# Patient Record
Sex: FEMALE | Race: OTHER | HISPANIC OR LATINO | URBAN - METROPOLITAN AREA
[De-identification: names, ages, dates, MRNs, and addresses within clinical notes are randomized per-mention and may not be internally consistent; named-entity substitution may affect disease eponyms.]

---

## 2018-06-25 ENCOUNTER — EMERGENCY (EMERGENCY)
Facility: HOSPITAL | Age: 43
LOS: 1 days | Discharge: HOME | End: 2018-06-25
Attending: EMERGENCY MEDICINE | Admitting: EMERGENCY MEDICINE
Payer: COMMERCIAL

## 2018-06-25 VITALS
SYSTOLIC BLOOD PRESSURE: 134 MMHG | HEART RATE: 55 BPM | TEMPERATURE: 98 F | RESPIRATION RATE: 18 BRPM | DIASTOLIC BLOOD PRESSURE: 80 MMHG | OXYGEN SATURATION: 98 %

## 2018-06-25 VITALS
HEART RATE: 75 BPM | DIASTOLIC BLOOD PRESSURE: 75 MMHG | WEIGHT: 132.94 LBS | HEIGHT: 61 IN | RESPIRATION RATE: 189 BRPM | TEMPERATURE: 98 F | OXYGEN SATURATION: 96 % | SYSTOLIC BLOOD PRESSURE: 165 MMHG

## 2018-06-25 LAB
ALBUMIN SERPL ELPH-MCNC: 4.5 G/DL — SIGNIFICANT CHANGE UP (ref 3.3–5)
ALP SERPL-CCNC: 53 U/L — SIGNIFICANT CHANGE UP (ref 40–120)
ALT FLD-CCNC: 5 U/L — LOW (ref 10–45)
ANION GAP SERPL CALC-SCNC: 12 MMOL/L — SIGNIFICANT CHANGE UP (ref 5–17)
APPEARANCE UR: CLEAR — SIGNIFICANT CHANGE UP
AST SERPL-CCNC: 14 U/L — SIGNIFICANT CHANGE UP (ref 10–40)
BASOPHILS NFR BLD AUTO: 1 % — SIGNIFICANT CHANGE UP (ref 0–2)
BILIRUB SERPL-MCNC: 0.5 MG/DL — SIGNIFICANT CHANGE UP (ref 0.2–1.2)
BILIRUB UR-MCNC: NEGATIVE — SIGNIFICANT CHANGE UP
BUN SERPL-MCNC: 12 MG/DL — SIGNIFICANT CHANGE UP (ref 7–23)
CALCIUM SERPL-MCNC: 8.8 MG/DL — SIGNIFICANT CHANGE UP (ref 8.4–10.5)
CHLORIDE SERPL-SCNC: 102 MMOL/L — SIGNIFICANT CHANGE UP (ref 96–108)
CO2 SERPL-SCNC: 24 MMOL/L — SIGNIFICANT CHANGE UP (ref 22–31)
COLOR SPEC: YELLOW — SIGNIFICANT CHANGE UP
CREAT SERPL-MCNC: 0.67 MG/DL — SIGNIFICANT CHANGE UP (ref 0.5–1.3)
DIFF PNL FLD: ABNORMAL
EOSINOPHIL NFR BLD AUTO: 1.7 % — SIGNIFICANT CHANGE UP (ref 0–6)
EXTRA BLUE TOP TUBE: SIGNIFICANT CHANGE UP
GLUCOSE SERPL-MCNC: 95 MG/DL — SIGNIFICANT CHANGE UP (ref 70–99)
GLUCOSE UR QL: NEGATIVE — SIGNIFICANT CHANGE UP
HCT VFR BLD CALC: 26.7 % — LOW (ref 34.5–45)
HGB BLD-MCNC: 7.6 G/DL — LOW (ref 11.5–15.5)
KETONES UR-MCNC: NEGATIVE — SIGNIFICANT CHANGE UP
LEUKOCYTE ESTERASE UR-ACNC: ABNORMAL
LYMPHOCYTES # BLD AUTO: 32.3 % — SIGNIFICANT CHANGE UP (ref 13–44)
MCHC RBC-ENTMCNC: 19.7 PG — LOW (ref 27–34)
MCHC RBC-ENTMCNC: 28.5 G/DL — LOW (ref 32–36)
MCV RBC AUTO: 69.2 FL — LOW (ref 80–100)
MONOCYTES NFR BLD AUTO: 7.7 % — SIGNIFICANT CHANGE UP (ref 2–14)
NEUTROPHILS NFR BLD AUTO: 57.3 % — SIGNIFICANT CHANGE UP (ref 43–77)
NITRITE UR-MCNC: NEGATIVE — SIGNIFICANT CHANGE UP
PH UR: 6 — SIGNIFICANT CHANGE UP (ref 5–8)
PLATELET # BLD AUTO: 388 K/UL — SIGNIFICANT CHANGE UP (ref 150–400)
POTASSIUM SERPL-MCNC: 4.4 MMOL/L — SIGNIFICANT CHANGE UP (ref 3.5–5.3)
POTASSIUM SERPL-SCNC: 4.4 MMOL/L — SIGNIFICANT CHANGE UP (ref 3.5–5.3)
PROT SERPL-MCNC: 7.7 G/DL — SIGNIFICANT CHANGE UP (ref 6–8.3)
PROT UR-MCNC: NEGATIVE MG/DL — SIGNIFICANT CHANGE UP
RBC # BLD: 3.86 M/UL — SIGNIFICANT CHANGE UP (ref 3.8–5.2)
RBC # FLD: 17.3 % — HIGH (ref 10.3–16.9)
SODIUM SERPL-SCNC: 138 MMOL/L — SIGNIFICANT CHANGE UP (ref 135–145)
SP GR SPEC: <=1.005 — SIGNIFICANT CHANGE UP (ref 1–1.03)
UROBILINOGEN FLD QL: 0.2 E.U./DL — SIGNIFICANT CHANGE UP
WBC # BLD: 5.9 K/UL — SIGNIFICANT CHANGE UP (ref 3.8–10.5)
WBC # FLD AUTO: 5.9 K/UL — SIGNIFICANT CHANGE UP (ref 3.8–10.5)

## 2018-06-25 PROCEDURE — 81001 URINALYSIS AUTO W/SCOPE: CPT

## 2018-06-25 PROCEDURE — 99285 EMERGENCY DEPT VISIT HI MDM: CPT

## 2018-06-25 PROCEDURE — 36415 COLL VENOUS BLD VENIPUNCTURE: CPT

## 2018-06-25 PROCEDURE — 74176 CT ABD & PELVIS W/O CONTRAST: CPT | Mod: 26

## 2018-06-25 PROCEDURE — 96375 TX/PRO/DX INJ NEW DRUG ADDON: CPT

## 2018-06-25 PROCEDURE — 99284 EMERGENCY DEPT VISIT MOD MDM: CPT | Mod: 25

## 2018-06-25 PROCEDURE — 85025 COMPLETE CBC W/AUTO DIFF WBC: CPT

## 2018-06-25 PROCEDURE — 80053 COMPREHEN METABOLIC PANEL: CPT

## 2018-06-25 PROCEDURE — 96374 THER/PROPH/DIAG INJ IV PUSH: CPT

## 2018-06-25 PROCEDURE — 74176 CT ABD & PELVIS W/O CONTRAST: CPT

## 2018-06-25 RX ORDER — MOXIFLOXACIN HYDROCHLORIDE TABLETS, 400 MG 400 MG/1
1 TABLET, FILM COATED ORAL
Qty: 6 | Refills: 0 | OUTPATIENT
Start: 2018-06-25 | End: 2018-06-27

## 2018-06-25 RX ORDER — KETOROLAC TROMETHAMINE 30 MG/ML
30 SYRINGE (ML) INJECTION ONCE
Qty: 0 | Refills: 0 | Status: DISCONTINUED | OUTPATIENT
Start: 2018-06-25 | End: 2018-06-25

## 2018-06-25 RX ORDER — ACETAMINOPHEN WITH CODEINE 300MG-30MG
2 TABLET ORAL
Qty: 20 | Refills: 0 | OUTPATIENT
Start: 2018-06-25 | End: 2018-06-29

## 2018-06-25 RX ORDER — IRON SUCROSE 20 MG/ML
200 INJECTION, SOLUTION INTRAVENOUS ONCE
Qty: 0 | Refills: 0 | Status: COMPLETED | OUTPATIENT
Start: 2018-06-25 | End: 2018-06-25

## 2018-06-25 RX ORDER — CIPROFLOXACIN LACTATE 400MG/40ML
500 VIAL (ML) INTRAVENOUS ONCE
Qty: 0 | Refills: 0 | Status: COMPLETED | OUTPATIENT
Start: 2018-06-25 | End: 2018-06-25

## 2018-06-25 RX ORDER — ONDANSETRON 8 MG/1
4 TABLET, FILM COATED ORAL ONCE
Qty: 0 | Refills: 0 | Status: COMPLETED | OUTPATIENT
Start: 2018-06-25 | End: 2018-06-25

## 2018-06-25 RX ORDER — ACETAMINOPHEN WITH CODEINE 300MG-30MG
2 TABLET ORAL ONCE
Qty: 0 | Refills: 0 | Status: DISCONTINUED | OUTPATIENT
Start: 2018-06-25 | End: 2018-06-25

## 2018-06-25 RX ORDER — SODIUM CHLORIDE 9 MG/ML
1000 INJECTION INTRAMUSCULAR; INTRAVENOUS; SUBCUTANEOUS ONCE
Qty: 0 | Refills: 0 | Status: COMPLETED | OUTPATIENT
Start: 2018-06-25 | End: 2018-06-25

## 2018-06-25 RX ORDER — MORPHINE SULFATE 50 MG/1
4 CAPSULE, EXTENDED RELEASE ORAL ONCE
Qty: 0 | Refills: 0 | Status: DISCONTINUED | OUTPATIENT
Start: 2018-06-25 | End: 2018-06-25

## 2018-06-25 RX ADMIN — MORPHINE SULFATE 4 MILLIGRAM(S): 50 CAPSULE, EXTENDED RELEASE ORAL at 20:45

## 2018-06-25 RX ADMIN — MORPHINE SULFATE 4 MILLIGRAM(S): 50 CAPSULE, EXTENDED RELEASE ORAL at 19:52

## 2018-06-25 RX ADMIN — Medication 2 TABLET(S): at 22:25

## 2018-06-25 RX ADMIN — Medication 30 MILLIGRAM(S): at 18:08

## 2018-06-25 RX ADMIN — IRON SUCROSE 110 MILLIGRAM(S): 20 INJECTION, SOLUTION INTRAVENOUS at 21:18

## 2018-06-25 RX ADMIN — ONDANSETRON 4 MILLIGRAM(S): 8 TABLET, FILM COATED ORAL at 18:08

## 2018-06-25 RX ADMIN — SODIUM CHLORIDE 2000 MILLILITER(S): 9 INJECTION INTRAMUSCULAR; INTRAVENOUS; SUBCUTANEOUS at 18:07

## 2018-06-25 RX ADMIN — Medication 30 MILLIGRAM(S): at 18:22

## 2018-06-25 RX ADMIN — Medication 500 MILLIGRAM(S): at 22:25

## 2018-06-25 NOTE — ED PROVIDER NOTE - MEDICAL DECISION MAKING DETAILS
right flank pain and nausea concerning for kidney stone. pt well appearing. pain meds and fluids given. dispo pending labs and ct scan

## 2018-06-25 NOTE — ED PROVIDER NOTE - PROGRESS NOTE DETAILS
Fe def anemia noted- px with mild sob, occ lightheadedness- hx of anemia- compliant w iron- required infusions during preg- hgb dropped to 4- will give dose of iron ref to heme- also to OB_ px w Copper IUD- bleeding increased for 4 d to 10 d- will refer to OB- for IUD removal sx w urination- will give 3 d course of cipro

## 2018-06-25 NOTE — ED PROVIDER NOTE - ATTENDING CONTRIBUTION TO CARE
42 F w Flank pain- R sided rad to abd- mild x 2 days, pain spiked today  pain in flank when urinates- no burning /dysuria  vss  s1s2 lungs cta bl  abd soft nt nd +bs + R cva ttp  IMP- Flank pain  RO stones, labs, UA

## 2018-06-25 NOTE — ED ADULT NURSE NOTE - OBJECTIVE STATEMENT
Patient presents to the ED complaining of right sided flank pain, getting worst today. Denies any fever or chills.

## 2018-06-25 NOTE — ED PROVIDER NOTE - OBJECTIVE STATEMENT
43 y/o female with hx of HTN c/o right flank pain x 2 days. pt states mild pain past 2 days and worsened today. + sharp pain radiates to abdomen. no fever or chills. + nausea. no dysuria or hematuria. pt reports pain in right flank with urination. no vomiting. no cp or sob. no further complaints.

## 2018-06-25 NOTE — ED PROVIDER NOTE - CARE PLAN
Principal Discharge DX:	Flank pain Principal Discharge DX:	Flank pain  Secondary Diagnosis:	Iron deficiency anemia, unspecified iron deficiency anemia type Principal Discharge DX:	Flank pain  Secondary Diagnosis:	Iron deficiency anemia, unspecified iron deficiency anemia type  Secondary Diagnosis:	Cystitis

## 2018-06-25 NOTE — ED PROVIDER NOTE - GASTROINTESTINAL, MLM
Abdomen soft,   + tenderness to right cva region. abd non tender. no guarding. no rebound. no distention

## 2018-06-29 DIAGNOSIS — D50.9 IRON DEFICIENCY ANEMIA, UNSPECIFIED: ICD-10-CM

## 2018-06-29 DIAGNOSIS — R10.9 UNSPECIFIED ABDOMINAL PAIN: ICD-10-CM

## 2018-06-29 DIAGNOSIS — N30.90 CYSTITIS, UNSPECIFIED WITHOUT HEMATURIA: ICD-10-CM

## 2018-06-29 DIAGNOSIS — I10 ESSENTIAL (PRIMARY) HYPERTENSION: ICD-10-CM

## 2018-07-03 ENCOUNTER — LABORATORY RESULT (OUTPATIENT)
Age: 43
End: 2018-07-03

## 2018-07-03 ENCOUNTER — APPOINTMENT (OUTPATIENT)
Dept: HEMATOLOGY ONCOLOGY | Facility: CLINIC | Age: 43
End: 2018-07-03
Payer: COMMERCIAL

## 2018-07-03 VITALS
SYSTOLIC BLOOD PRESSURE: 160 MMHG | WEIGHT: 133 LBS | BODY MASS INDEX: 25.11 KG/M2 | HEART RATE: 70 BPM | TEMPERATURE: 98.8 F | HEIGHT: 61 IN | DIASTOLIC BLOOD PRESSURE: 92 MMHG | RESPIRATION RATE: 14 BRPM | OXYGEN SATURATION: 100 %

## 2018-07-03 DIAGNOSIS — Z78.9 OTHER SPECIFIED HEALTH STATUS: ICD-10-CM

## 2018-07-03 DIAGNOSIS — Z86.79 PERSONAL HISTORY OF OTHER DISEASES OF THE CIRCULATORY SYSTEM: ICD-10-CM

## 2018-07-03 DIAGNOSIS — Z86.39 PERSONAL HISTORY OF OTHER ENDOCRINE, NUTRITIONAL AND METABOLIC DISEASE: ICD-10-CM

## 2018-07-03 DIAGNOSIS — Z80.3 FAMILY HISTORY OF MALIGNANT NEOPLASM OF BREAST: ICD-10-CM

## 2018-07-03 DIAGNOSIS — Z84.1 FAMILY HISTORY OF DISORDERS OF KIDNEY AND URETER: ICD-10-CM

## 2018-07-03 DIAGNOSIS — Z83.79 FAMILY HISTORY OF OTHER DISEASES OF THE DIGESTIVE SYSTEM: ICD-10-CM

## 2018-07-03 DIAGNOSIS — Z82.49 FAMILY HISTORY OF ISCHEMIC HEART DISEASE AND OTHER DISEASES OF THE CIRCULATORY SYSTEM: ICD-10-CM

## 2018-07-03 DIAGNOSIS — Z83.3 FAMILY HISTORY OF DIABETES MELLITUS: ICD-10-CM

## 2018-07-03 PROCEDURE — 99204 OFFICE O/P NEW MOD 45 MIN: CPT | Mod: 25

## 2018-07-03 PROCEDURE — 36415 COLL VENOUS BLD VENIPUNCTURE: CPT

## 2018-07-03 RX ORDER — ASPIRIN 81 MG
81 TABLET, DELAYED RELEASE (ENTERIC COATED) ORAL DAILY
Refills: 0 | Status: ACTIVE | COMMUNITY

## 2018-07-05 ENCOUNTER — RESULT REVIEW (OUTPATIENT)
Age: 43
End: 2018-07-05

## 2018-07-05 LAB
25(OH)D3 SERPL-MCNC: 8.4 NG/ML
BASOPHILS # BLD AUTO: 0.05 K/UL
BASOPHILS NFR BLD AUTO: 0.9 %
EOSINOPHIL # BLD AUTO: 0.13 K/UL
EOSINOPHIL NFR BLD AUTO: 2.2 %
ERYTHROCYTE [SEDIMENTATION RATE] IN BLOOD BY WESTERGREN METHOD: 33 MM/HR
FERRITIN SERPL-MCNC: 47 NG/ML
HAPTOGLOB SERPL-MCNC: 136 MG/DL
HCT VFR BLD CALC: 30.8 %
HGB BLD-MCNC: 8.8 G/DL
IMM GRANULOCYTES NFR BLD AUTO: 0.2 %
IRON SATN MFR SERPL: 6 %
IRON SERPL-MCNC: 24 UG/DL
LDH SERPL-CCNC: 230 U/L
LYMPHOCYTES # BLD AUTO: 1.56 K/UL
LYMPHOCYTES NFR BLD AUTO: 26.7 %
MAN DIFF?: NORMAL
MCHC RBC-ENTMCNC: 20.4 PG
MCHC RBC-ENTMCNC: 28.6 GM/DL
MCV RBC AUTO: 71.5 FL
MONOCYTES # BLD AUTO: 0.29 K/UL
MONOCYTES NFR BLD AUTO: 5 %
NEUTROPHILS # BLD AUTO: 3.8 K/UL
NEUTROPHILS NFR BLD AUTO: 65 %
PLATELET # BLD AUTO: 240 K/UL
RBC # BLD: 4.31 M/UL
RBC # FLD: 20.5 %
TIBC SERPL-MCNC: 402 UG/DL
TSH SERPL-ACNC: 6.04 UIU/ML
UIBC SERPL-MCNC: 378 UG/DL
VIT B12 SERPL-MCNC: 254 PG/ML
WBC # FLD AUTO: 5.84 K/UL

## 2018-07-06 ENCOUNTER — APPOINTMENT (OUTPATIENT)
Dept: OBGYN | Facility: CLINIC | Age: 43
End: 2018-07-06

## 2018-07-11 RX ORDER — FERUMOXYTOL 510 MG/17ML
510 INJECTION INTRAVENOUS ONCE
Qty: 0 | Refills: 0 | Status: COMPLETED | OUTPATIENT
Start: 2018-07-12 | End: 2018-07-12

## 2018-07-12 ENCOUNTER — OUTPATIENT (OUTPATIENT)
Dept: OUTPATIENT SERVICES | Facility: HOSPITAL | Age: 43
LOS: 1 days | End: 2018-07-12
Payer: COMMERCIAL

## 2018-07-12 ENCOUNTER — APPOINTMENT (OUTPATIENT)
Dept: INFUSION THERAPY | Facility: HOSPITAL | Age: 43
End: 2018-07-12

## 2018-07-12 VITALS
TEMPERATURE: 98 F | RESPIRATION RATE: 18 BRPM | HEART RATE: 70 BPM | DIASTOLIC BLOOD PRESSURE: 70 MMHG | SYSTOLIC BLOOD PRESSURE: 100 MMHG | OXYGEN SATURATION: 99 %

## 2018-07-12 DIAGNOSIS — D50.9 IRON DEFICIENCY ANEMIA, UNSPECIFIED: ICD-10-CM

## 2018-07-12 PROCEDURE — 96365 THER/PROPH/DIAG IV INF INIT: CPT

## 2018-07-12 RX ADMIN — FERUMOXYTOL 117 MILLIGRAM(S): 510 INJECTION INTRAVENOUS at 14:38

## 2018-07-16 ENCOUNTER — APPOINTMENT (OUTPATIENT)
Dept: HEMATOLOGY ONCOLOGY | Facility: CLINIC | Age: 43
End: 2018-07-16
Payer: MEDICAID

## 2018-07-16 ENCOUNTER — APPOINTMENT (OUTPATIENT)
Dept: HEART AND VASCULAR | Facility: CLINIC | Age: 43
End: 2018-07-16
Payer: MEDICAID

## 2018-07-16 VITALS
BODY MASS INDEX: 24.54 KG/M2 | WEIGHT: 129.98 LBS | OXYGEN SATURATION: 98 % | HEIGHT: 61 IN | TEMPERATURE: 99.2 F | HEART RATE: 75 BPM | SYSTOLIC BLOOD PRESSURE: 169 MMHG | DIASTOLIC BLOOD PRESSURE: 90 MMHG

## 2018-07-16 VITALS
DIASTOLIC BLOOD PRESSURE: 100 MMHG | BODY MASS INDEX: 24.92 KG/M2 | HEIGHT: 61 IN | SYSTOLIC BLOOD PRESSURE: 171 MMHG | WEIGHT: 132 LBS

## 2018-07-16 DIAGNOSIS — R07.9 CHEST PAIN, UNSPECIFIED: ICD-10-CM

## 2018-07-16 DIAGNOSIS — R94.39 ABNORMAL RESULT OF OTHER CARDIOVASCULAR FUNCTION STUDY: ICD-10-CM

## 2018-07-16 DIAGNOSIS — R93.8 ABNORMAL FINDINGS ON DIAGNOSTIC IMAGING OF OTHER SPECIFIED BODY STRUCTURES: ICD-10-CM

## 2018-07-16 PROCEDURE — 93000 ELECTROCARDIOGRAM COMPLETE: CPT

## 2018-07-16 PROCEDURE — 99204 OFFICE O/P NEW MOD 45 MIN: CPT

## 2018-07-16 PROCEDURE — 96372 THER/PROPH/DIAG INJ SC/IM: CPT

## 2018-07-16 PROCEDURE — 99214 OFFICE O/P EST MOD 30 MIN: CPT | Mod: 25

## 2018-07-16 RX ORDER — CYANOCOBALAMIN 1000 UG/ML
1000 INJECTION INTRAMUSCULAR; SUBCUTANEOUS
Qty: 0 | Refills: 0 | Status: COMPLETED | OUTPATIENT
Start: 2018-07-16

## 2018-07-16 RX ADMIN — CYANOCOBALAMIN 0 MCG/ML: 1000 INJECTION INTRAMUSCULAR; SUBCUTANEOUS at 00:00

## 2018-07-17 ENCOUNTER — APPOINTMENT (OUTPATIENT)
Dept: INFUSION THERAPY | Facility: HOSPITAL | Age: 43
End: 2018-07-17

## 2018-07-17 ENCOUNTER — OUTPATIENT (OUTPATIENT)
Dept: OUTPATIENT SERVICES | Facility: HOSPITAL | Age: 43
LOS: 1 days | End: 2018-07-17
Payer: COMMERCIAL

## 2018-07-17 VITALS
DIASTOLIC BLOOD PRESSURE: 77 MMHG | SYSTOLIC BLOOD PRESSURE: 139 MMHG | RESPIRATION RATE: 18 BRPM | OXYGEN SATURATION: 100 % | HEART RATE: 57 BPM | TEMPERATURE: 97 F

## 2018-07-17 DIAGNOSIS — D50.9 IRON DEFICIENCY ANEMIA, UNSPECIFIED: ICD-10-CM

## 2018-07-17 PROCEDURE — 96365 THER/PROPH/DIAG IV INF INIT: CPT

## 2018-07-17 RX ORDER — FERUMOXYTOL 510 MG/17ML
510 INJECTION INTRAVENOUS ONCE
Qty: 0 | Refills: 0 | Status: COMPLETED | OUTPATIENT
Start: 2018-07-17 | End: 2018-07-17

## 2018-07-17 RX ADMIN — FERUMOXYTOL 117 MILLIGRAM(S): 510 INJECTION INTRAVENOUS at 10:58

## 2018-07-18 ENCOUNTER — LABORATORY RESULT (OUTPATIENT)
Age: 43
End: 2018-07-18

## 2018-07-18 PROBLEM — I10 ESSENTIAL (PRIMARY) HYPERTENSION: Chronic | Status: ACTIVE | Noted: 2018-06-25

## 2018-07-19 ENCOUNTER — APPOINTMENT (OUTPATIENT)
Dept: HEMATOLOGY ONCOLOGY | Facility: CLINIC | Age: 43
End: 2018-07-19
Payer: MEDICAID

## 2018-07-19 LAB
BASOPHILS NFR BLD AUTO: 0.7 %
EOSINOPHIL NFR BLD AUTO: 1.6 %
HCT VFR BLD CALC: 31.2 %
HGB BLD-MCNC: 8.8 G/DL
LYMPHOCYTES NFR BLD AUTO: 12.7 %
MAN DIFF?: NO
MCHC RBC-ENTMCNC: 21.8 PG
MCHC RBC-ENTMCNC: 28.2 G/DL
MCV RBC AUTO: 77.4 FL
MONOCYTES NFR BLD AUTO: 5.4 %
NEUTROPHILS NFR BLD AUTO: 79.6 %
PLATELET # BLD AUTO: 309 K/UL
RBC # BLD: 4.03 M/UL
RBC # FLD: 24.3 %
WBC # FLD AUTO: 7.6 K/UL

## 2018-07-19 PROCEDURE — 36415 COLL VENOUS BLD VENIPUNCTURE: CPT

## 2018-07-20 VITALS
HEART RATE: 58 BPM | RESPIRATION RATE: 18 BRPM | OXYGEN SATURATION: 100 % | HEIGHT: 61 IN | WEIGHT: 130.07 LBS | TEMPERATURE: 98 F | SYSTOLIC BLOOD PRESSURE: 144 MMHG | DIASTOLIC BLOOD PRESSURE: 88 MMHG

## 2018-07-20 RX ORDER — CHLORHEXIDINE GLUCONATE 213 G/1000ML
1 SOLUTION TOPICAL ONCE
Qty: 0 | Refills: 0 | Status: DISCONTINUED | OUTPATIENT
Start: 2018-07-23 | End: 2018-07-23

## 2018-07-20 RX ORDER — LABETALOL HCL 100 MG
0 TABLET ORAL
Qty: 0 | Refills: 0 | COMMUNITY

## 2018-07-20 RX ORDER — HYDRALAZINE HCL 50 MG
0 TABLET ORAL
Qty: 0 | Refills: 0 | COMMUNITY

## 2018-07-20 NOTE — H&P ADULT - ASSESSMENT
43yo female with PMHx significant for uncontrolled hypertension, hypothyroidism, Fe-deficiency anemia with weekly IV iron (last dose 7/17/18) due to menorrhagia 2/2 IUD-->Last Hgb 8.8 on 7/3/2018, vitamin D deficiency, vitamin B12 deficiency, and history of MI after birth delivery in 4/2017, was referred to cardiologist after patient was found to have elevated BP and abnormal EKG that showed Q waves, as well as c/o anginal symptoms, pt is now referred for cardiac cath with possible intervention for suspected CAD.

## 2018-07-20 NOTE — H&P ADULT - PMH
HTN (hypertension)    Hyperlipidemia    Hypothyroidism    Iron deficiency anemia due to chronic blood loss    Myocardial infarction    Vitamin B12 deficiency    Vitamin D deficiency

## 2018-07-20 NOTE — H&P ADULT - HISTORY OF PRESENT ILLNESS
41yo female with PMHx significant for uncontrolled hypertension, hypothyroidism, Fe-deficiency anemia with weekly IV iron (last dose 7/17/18) due to menorrhagia 2/2 IUD-->Last Hgb 8.8 on 7/3/2018, vitamin D deficiency, vitamin B12 deficiency, and history of MI after birth delivery in 4/2017 was referred to cardiologist after patient was found to have elevated BP and abnormal EKG that showed ?MI. Patient reports intermittent substernal chest discomfort that is described as pressure-like, that is non-radiating, occurring irrespective of activity but mostly with exertion (when ambulating >1 block) and relieved with rest over the past month. Associated symptoms includes dyspnea with exertion, exertional fatigue, palpitations, and dizziness.   Patient had EKG that showed Q-wave in anterior leadswas prescribed Imdur but was not able to tolerate to due symptoms of nausea, vomiting, chest pain, and shortness and was recommend to discontinue it.  Patient reported that she workup for uncontrolled BP and had renal artery ultrasound and MRI Abdomen that did not reveal renal artery stenosis.  In light of patient's risk factors including uncontrolled BP, CCS IV anginal equivalent symptoms and abnormal EKG patient now presents for diagnostic Left Heart Catheterization. 41yo female with PMHx significant for uncontrolled hypertension, hypothyroidism, Fe-deficiency anemia with weekly IV iron (last dose 7/17/18) due to menorrhagia 2/2 IUD-->Last Hgb 8.8 on 7/3/2018, vitamin D deficiency, vitamin B12 deficiency, and history of MI after birth delivery in 4/2017 was referred to cardiologist after patient was found to have elevated BP and abnormal EKG that showed ?MI. Patient reports intermittent substernal chest discomfort that is described as pressure-like, that is non-radiating, occurring irrespective of activity but mostly with exertion (when ambulating >1 block) and relieved with rest over the past month. Associated symptoms includes dyspnea with exertion, exertional fatigue, palpitations, and dizziness.   Patient had EKG that showed Q-wave in anterior leadswas prescribed Imdur but was not able to tolerate to due symptoms of nausea, vomiting, chest pain, and shortness and was recommend to discontinue it.  Patient reported that she workup for uncontrolled BP and had renal artery ultrasound and MRI Abdomen that did not reveal renal artery stenosis.  In light of patient's risk factors including uncontrolled BP, CCS IV anginal equivalent symptoms and abnormal EKG patient now presents for diagnostic Left Heart Catheterization.    NEED TO CALL DR. MCBRIDE (hematology) to see patient prior to [facility] as per Dr. Dooley

## 2018-07-20 NOTE — H&P ADULT - FAMILY HISTORY
Mother  Still living? Unknown  Family history of hypertension, Age at diagnosis: Age Unknown  Family history of diabetes mellitus, Age at diagnosis: Age Unknown  Family history of kidney disease, Age at diagnosis: Age Unknown

## 2018-07-23 ENCOUNTER — OUTPATIENT (OUTPATIENT)
Dept: OUTPATIENT SERVICES | Facility: HOSPITAL | Age: 43
LOS: 1 days | Discharge: NURSING, REHAB OR LONG-TERM CARE | End: 2018-07-23
Payer: COMMERCIAL

## 2018-07-23 DIAGNOSIS — Z87.59 PERSONAL HISTORY OF OTHER COMPLICATIONS OF PREGNANCY, CHILDBIRTH AND THE PUERPERIUM: Chronic | ICD-10-CM

## 2018-07-23 DIAGNOSIS — D50.0 IRON DEFICIENCY ANEMIA SECONDARY TO BLOOD LOSS (CHRONIC): ICD-10-CM

## 2018-07-23 DIAGNOSIS — Z98.890 OTHER SPECIFIED POSTPROCEDURAL STATES: Chronic | ICD-10-CM

## 2018-07-23 LAB
ALBUMIN SERPL ELPH-MCNC: 4.5 G/DL — SIGNIFICANT CHANGE UP (ref 3.3–5)
ALP SERPL-CCNC: 60 U/L — SIGNIFICANT CHANGE UP (ref 40–120)
ALT FLD-CCNC: 8 U/L — LOW (ref 10–45)
ANION GAP SERPL CALC-SCNC: 12 MMOL/L — SIGNIFICANT CHANGE UP (ref 5–17)
APTT BLD: 34 SEC — SIGNIFICANT CHANGE UP (ref 27.5–37.4)
AST SERPL-CCNC: 15 U/L — SIGNIFICANT CHANGE UP (ref 10–40)
BASOPHILS NFR BLD AUTO: 0.9 % — SIGNIFICANT CHANGE UP (ref 0–2)
BILIRUB SERPL-MCNC: 0.4 MG/DL — SIGNIFICANT CHANGE UP (ref 0.2–1.2)
BUN SERPL-MCNC: 9 MG/DL — SIGNIFICANT CHANGE UP (ref 7–23)
CALCIUM SERPL-MCNC: 8.9 MG/DL — SIGNIFICANT CHANGE UP (ref 8.4–10.5)
CHLORIDE SERPL-SCNC: 99 MMOL/L — SIGNIFICANT CHANGE UP (ref 96–108)
CHOLEST SERPL-MCNC: 208 MG/DL — HIGH (ref 10–199)
CK MB CFR SERPL CALC: <1 NG/ML — SIGNIFICANT CHANGE UP (ref 0–6.7)
CK SERPL-CCNC: 59 U/L — SIGNIFICANT CHANGE UP (ref 25–170)
CO2 SERPL-SCNC: 26 MMOL/L — SIGNIFICANT CHANGE UP (ref 22–31)
CREAT SERPL-MCNC: 0.66 MG/DL — SIGNIFICANT CHANGE UP (ref 0.5–1.3)
CRP SERPL-MCNC: 0.36 MG/DL — SIGNIFICANT CHANGE UP (ref 0–0.4)
EOSINOPHIL NFR BLD AUTO: 2.5 % — SIGNIFICANT CHANGE UP (ref 0–6)
GLUCOSE SERPL-MCNC: 94 MG/DL — SIGNIFICANT CHANGE UP (ref 70–99)
HBA1C BLD-MCNC: 4.8 % — SIGNIFICANT CHANGE UP (ref 4–5.6)
HCG SERPL-ACNC: <.1 MIU/ML — SIGNIFICANT CHANGE UP
HCT VFR BLD CALC: 34.7 % — SIGNIFICANT CHANGE UP (ref 34.5–45)
HDLC SERPL-MCNC: 68 MG/DL — SIGNIFICANT CHANGE UP (ref 40–125)
HGB BLD-MCNC: 10.2 G/DL — LOW (ref 11.5–15.5)
INR BLD: 1.01 — SIGNIFICANT CHANGE UP (ref 0.88–1.16)
LIPID PNL WITH DIRECT LDL SERPL: 118 MG/DL — SIGNIFICANT CHANGE UP
LYMPHOCYTES # BLD AUTO: 28.5 % — SIGNIFICANT CHANGE UP (ref 13–44)
MCHC RBC-ENTMCNC: 22.8 PG — LOW (ref 27–34)
MCHC RBC-ENTMCNC: 29.4 G/DL — LOW (ref 32–36)
MCV RBC AUTO: 77.5 FL — LOW (ref 80–100)
MONOCYTES NFR BLD AUTO: 7 % — SIGNIFICANT CHANGE UP (ref 2–14)
NEUTROPHILS NFR BLD AUTO: 61.1 % — SIGNIFICANT CHANGE UP (ref 43–77)
PLATELET # BLD AUTO: 275 K/UL — SIGNIFICANT CHANGE UP (ref 150–400)
POTASSIUM SERPL-MCNC: 3.9 MMOL/L — SIGNIFICANT CHANGE UP (ref 3.5–5.3)
POTASSIUM SERPL-SCNC: 3.9 MMOL/L — SIGNIFICANT CHANGE UP (ref 3.5–5.3)
PROT SERPL-MCNC: 7.7 G/DL — SIGNIFICANT CHANGE UP (ref 6–8.3)
PROTHROM AB SERPL-ACNC: 11.2 SEC — SIGNIFICANT CHANGE UP (ref 9.8–12.7)
RBC # BLD: 4.48 M/UL — SIGNIFICANT CHANGE UP (ref 3.8–5.2)
RBC # FLD: 26.9 % — HIGH (ref 10.3–16.9)
SODIUM SERPL-SCNC: 137 MMOL/L — SIGNIFICANT CHANGE UP (ref 135–145)
TOTAL CHOLESTEROL/HDL RATIO MEASUREMENT: 3.1 RATIO — LOW (ref 3.3–7.1)
TRIGL SERPL-MCNC: 109 MG/DL — SIGNIFICANT CHANGE UP (ref 10–149)
WBC # BLD: 5.6 K/UL — SIGNIFICANT CHANGE UP (ref 3.8–10.5)
WBC # FLD AUTO: 5.6 K/UL — SIGNIFICANT CHANGE UP (ref 3.8–10.5)

## 2018-07-23 PROCEDURE — 82550 ASSAY OF CK (CPK): CPT

## 2018-07-23 PROCEDURE — 93458 L HRT ARTERY/VENTRICLE ANGIO: CPT

## 2018-07-23 PROCEDURE — 80061 LIPID PANEL: CPT

## 2018-07-23 PROCEDURE — 93458 L HRT ARTERY/VENTRICLE ANGIO: CPT | Mod: 26

## 2018-07-23 PROCEDURE — 83036 HEMOGLOBIN GLYCOSYLATED A1C: CPT

## 2018-07-23 PROCEDURE — 93010 ELECTROCARDIOGRAM REPORT: CPT

## 2018-07-23 PROCEDURE — 82553 CREATINE MB FRACTION: CPT

## 2018-07-23 PROCEDURE — C1887: CPT

## 2018-07-23 PROCEDURE — C1769: CPT

## 2018-07-23 PROCEDURE — 86140 C-REACTIVE PROTEIN: CPT

## 2018-07-23 PROCEDURE — 93005 ELECTROCARDIOGRAM TRACING: CPT

## 2018-07-23 PROCEDURE — 99214 OFFICE O/P EST MOD 30 MIN: CPT

## 2018-07-23 PROCEDURE — 84702 CHORIONIC GONADOTROPIN TEST: CPT

## 2018-07-23 PROCEDURE — 85610 PROTHROMBIN TIME: CPT

## 2018-07-23 PROCEDURE — 80053 COMPREHEN METABOLIC PANEL: CPT

## 2018-07-23 PROCEDURE — 85730 THROMBOPLASTIN TIME PARTIAL: CPT

## 2018-07-23 PROCEDURE — 85025 COMPLETE CBC W/AUTO DIFF WBC: CPT

## 2018-07-23 RX ORDER — ERGOCALCIFEROL 1.25 MG/1
1 CAPSULE ORAL
Qty: 0 | Refills: 0 | COMMUNITY

## 2018-07-23 RX ORDER — CLOPIDOGREL BISULFATE 75 MG/1
600 TABLET, FILM COATED ORAL ONCE
Qty: 0 | Refills: 0 | Status: COMPLETED | OUTPATIENT
Start: 2018-07-23 | End: 2018-07-23

## 2018-07-23 RX ORDER — SODIUM CHLORIDE 9 MG/ML
1000 INJECTION INTRAMUSCULAR; INTRAVENOUS; SUBCUTANEOUS
Qty: 0 | Refills: 0 | Status: DISCONTINUED | OUTPATIENT
Start: 2018-07-23 | End: 2018-07-23

## 2018-07-23 RX ORDER — NIFEDIPINE 30 MG
1 TABLET, EXTENDED RELEASE 24 HR ORAL
Qty: 0 | Refills: 0 | COMMUNITY

## 2018-07-23 RX ORDER — ASPIRIN/CALCIUM CARB/MAGNESIUM 324 MG
325 TABLET ORAL ONCE
Qty: 0 | Refills: 0 | Status: COMPLETED | OUTPATIENT
Start: 2018-07-23 | End: 2018-07-23

## 2018-07-23 RX ADMIN — CLOPIDOGREL BISULFATE 600 MILLIGRAM(S): 75 TABLET, FILM COATED ORAL at 10:09

## 2018-07-23 RX ADMIN — Medication 325 MILLIGRAM(S): at 10:09

## 2018-07-23 RX ADMIN — SODIUM CHLORIDE 75 MILLILITER(S): 9 INJECTION INTRAMUSCULAR; INTRAVENOUS; SUBCUTANEOUS at 09:35

## 2018-07-23 NOTE — CONSULT NOTE ADULT - SUBJECTIVE AND OBJECTIVE BOX
HPI:  41yo female with PMHx significant for uncontrolled hypertension, hypothyroidism, Fe-deficiency anemia with weekly IV iron (last dose 18) due to menorrhagia 2/2 IUD-->Last Hgb 8.8 on 7/3/2018, vitamin D deficiency, vitamin B12 deficiency, and history of MI after birth delivery in 2017 was referred to cardiologist after patient was found to have elevated BP and abnormal EKG that showed ?MI. Patient reports intermittent substernal chest discomfort that is described as pressure-like, that is non-radiating, occurring irrespective of activity but mostly with exertion (when ambulating >1 block) and relieved with rest over the past month. Associated symptoms includes dyspnea with exertion, exertional fatigue, palpitations, and dizziness.   Patient had EKG that showed Q-wave in anterior leadswas prescribed Imdur but was not able to tolerate to due symptoms of nausea, vomiting, chest pain, and shortness and was recommend to discontinue it.  Patient reported that she workup for uncontrolled BP and had renal artery ultrasound and MRI Abdomen that did not reveal renal artery stenosis.  In light of patient's risk factors including uncontrolled BP, CCS IV anginal equivalent symptoms and abnormal EKG patient now presents for diagnostic Left Heart Catheterization.    NEED TO CALL DR. MCBRIDE (hematology) to see patient prior to [facility] as per Dr. Dooley (2018 15:27)      PAST MEDICAL & SURGICAL HISTORY:  Vitamin D deficiency  Vitamin B12 deficiency  Myocardial infarction  Iron deficiency anemia due to chronic blood loss  Hypothyroidism  Hyperlipidemia  HTN (hypertension)  History of 2  sections  Status post hip surgery       Review of Systems:  · General	negative  · Skin/Breast	negative  · Ophthalmologic	negative  · ENMT	negative  · Respiratory and Thorax	negative  · Cardiovascular	negative  · Gastrointestinal	negative  · Genitourinary	negative  · Musculoskeletal Comments	negative  · Neurological	negative      MEDICATIONS  (STANDING):  chlorhexidine 4% Liquid 1 Application(s) Topical once  sodium chloride 0.9%. 1000 milliLiter(s) (75 mL/Hr) IV Continuous <Continuous>    MEDICATIONS  (PRN):      Allergies    No Known Allergies    Intolerances        SOCIAL HISTORY:    FAMILY HISTORY:  Family history of kidney disease (Mother)  Family history of diabetes mellitus (Mother)  Family history of hypertension (Mother)      Vital Signs Last 24 Hrs  T(C): --  T(F): --  HR: --  BP: --  BP(mean): --  RR: --  SpO2: --     Physical Exam:  · Constitutional	detailed exam  · Constitutional Details	well-developed; well-groomed  · Eyes	EOMI; PERRL; no drainage or redness  · ENMT Comments	dry mucous membranes  · Respiratory	detailed exam  · Respiratory Comments	normal breath sounds at the lung bases bilaterally  · Cardiovascular	Regular rate & rhythm, normal S1, S2; no murmurs, gallops or rubs; no S3, S4  · Abd-Soft non tender  ·Ext-no edema, clubbing or cyanosis      LABS:                        10.2   5.6   )-----------( 275      ( 2018 08:37 )             34.7         137  |  99  |  9   ----------------------------<  94  3.9   |  26  |  0.66    Ca    8.9      2018 08:37    TPro  7.7  /  Alb  4.5  /  TBili  0.4  /  DBili  x   /  AST  15  /  ALT  8<L>  /  AlkPhos  60      PT/INR - ( 2018 08:37 )   PT: 11.2 sec;   INR: 1.01          PTT - ( 2018 08:37 )  PTT:34.0 sec      RADIOLOGY & ADDITIONAL STUDIES:

## 2018-07-23 NOTE — CONSULT NOTE ADULT - PROBLEM SELECTOR RECOMMENDATION 9
pt had a great response to IV Iron with Hb going up to 10.2 gr/dl. Discussed with cardiology, pt OK for cardiac cath with stent if needed and dual anti plt therapy...discussed with her and her  the fact that she will need to have her IUD out if she needs to be on dual plt therapy

## 2018-07-23 NOTE — PROGRESS NOTE ADULT - SUBJECTIVE AND OBJECTIVE BOX
Interventional Cardiology PA SDA Discharge Note    Patient without complaints.     Afebrile, VSS    Ext:    		  		        Right  Radial : no   hematoma,   no  bleeding, dressing; C/D/I      Pulses:    intact RAD to baseline     A/P:      41yo female with PMHx significant for uncontrolled hypertension, hypothyroidism, Fe-deficiency anemia with weekly IV iron (last dose 7/17/18) due to menorrhagia 2/2 IUD-->Last Hgb 8.8 on 7/3/2018, vitamin D deficiency, vitamin B12 deficiency, and history of MI after birth delivery in 4/2017 was referred to cardiologist after patient was found to have elevated BP and abnormal EKG that showed ?MI. Patient reports intermittent substernal chest discomfort that is described as pressure-like, that is non-radiating, occurring irrespective of activity but mostly with exertion (when ambulating >1 block) and relieved with rest over the past month. Associated symptoms includes dyspnea with exertion, exertional fatigue, palpitations, and dizziness.   Patient had EKG that showed Q-wave in anterior leadswas prescribed Imdur but was not able to tolerate to due symptoms of nausea, vomiting, chest pain, and shortness and was recommend to discontinue it.Patient reported that she workup for uncontrolled BP and had renal artery ultrasound and MRI Abdomen that did not reveal renal artery stenosis. In light of patient's risk factors including uncontrolled BP, CCS IV anginal equivalent symptoms and abnormal EKG patient now presents for diagnostic Left Heart Catheterization. Pt. aslo seen by holley Thompson as per Dr. Bourgeois's request pre-cath who states that pt had a great response to IV Iron with Hb going up to 10.2 gr/dl; stated that pt. could get cardiac cath with stent if needed and dual anti plt therapy...Dr. Jose discussed with pt. and her  the fact that she will need to have her IUD out if she needs to be on dual plt therapy. Pt. s/p cardiac cath 7/23/18 which revealed LMCA: widely patent; LAD: distal LAD 50% stenosis; Lcx: widely patent and RCA: dominant, widely patent; LVEDP 11 mmHg, LVEF: 55%. 	        1.	Stable for discharge as per attending  __Hassid_______ after bed rest, pt voids, groin/wrist stable and 30 minutes of ambulation.  2.	Follow-up with PMD/Cardiologist _Dr. Bourgeois__________ in 1-2 weeks  3.	Discharged forms signed and copies in chart

## 2018-07-24 DIAGNOSIS — E53.8 DEFICIENCY OF OTHER SPECIFIED B GROUP VITAMINS: ICD-10-CM

## 2018-07-24 DIAGNOSIS — I10 ESSENTIAL (PRIMARY) HYPERTENSION: ICD-10-CM

## 2018-07-24 DIAGNOSIS — R07.89 OTHER CHEST PAIN: ICD-10-CM

## 2018-07-24 DIAGNOSIS — D50.0 IRON DEFICIENCY ANEMIA SECONDARY TO BLOOD LOSS (CHRONIC): ICD-10-CM

## 2018-07-24 DIAGNOSIS — Z79.82 LONG TERM (CURRENT) USE OF ASPIRIN: ICD-10-CM

## 2018-07-24 DIAGNOSIS — I25.2 OLD MYOCARDIAL INFARCTION: ICD-10-CM

## 2018-07-24 DIAGNOSIS — E03.9 HYPOTHYROIDISM, UNSPECIFIED: ICD-10-CM

## 2018-07-24 DIAGNOSIS — I20.0 UNSTABLE ANGINA: ICD-10-CM

## 2018-07-24 PROBLEM — E78.5 HYPERLIPIDEMIA, UNSPECIFIED: Chronic | Status: ACTIVE | Noted: 2018-07-20

## 2018-07-24 PROBLEM — I21.9 ACUTE MYOCARDIAL INFARCTION, UNSPECIFIED: Chronic | Status: ACTIVE | Noted: 2018-07-20

## 2018-07-24 PROBLEM — E55.9 VITAMIN D DEFICIENCY, UNSPECIFIED: Chronic | Status: ACTIVE | Noted: 2018-07-20

## 2018-07-26 ENCOUNTER — APPOINTMENT (OUTPATIENT)
Dept: HEART AND VASCULAR | Facility: CLINIC | Age: 43
End: 2018-07-26
Payer: MEDICAID

## 2018-07-26 VITALS
SYSTOLIC BLOOD PRESSURE: 140 MMHG | HEART RATE: 75 BPM | WEIGHT: 129.98 LBS | BODY MASS INDEX: 24.54 KG/M2 | TEMPERATURE: 99 F | OXYGEN SATURATION: 98 % | DIASTOLIC BLOOD PRESSURE: 86 MMHG | HEIGHT: 61 IN

## 2018-07-26 DIAGNOSIS — Z99.89 OBSTRUCTIVE SLEEP APNEA (ADULT) (PEDIATRIC): ICD-10-CM

## 2018-07-26 DIAGNOSIS — G47.33 OBSTRUCTIVE SLEEP APNEA (ADULT) (PEDIATRIC): ICD-10-CM

## 2018-07-26 DIAGNOSIS — K90.9 INTESTINAL MALABSORPTION, UNSPECIFIED: ICD-10-CM

## 2018-07-26 PROCEDURE — 99401 PREV MED CNSL INDIV APPRX 15: CPT | Mod: 25

## 2018-07-26 PROCEDURE — 99214 OFFICE O/P EST MOD 30 MIN: CPT | Mod: 25

## 2018-07-26 PROCEDURE — 93000 ELECTROCARDIOGRAM COMPLETE: CPT

## 2018-09-13 ENCOUNTER — APPOINTMENT (OUTPATIENT)
Dept: ORTHOPEDIC SURGERY | Facility: CLINIC | Age: 43
End: 2018-09-13

## 2019-06-25 ENCOUNTER — EMERGENCY (EMERGENCY)
Facility: HOSPITAL | Age: 44
LOS: 1 days | Discharge: HOME | End: 2019-06-25
Admitting: EMERGENCY MEDICINE
Payer: COMMERCIAL

## 2019-06-25 VITALS
OXYGEN SATURATION: 98 % | DIASTOLIC BLOOD PRESSURE: 100 MMHG | SYSTOLIC BLOOD PRESSURE: 177 MMHG | HEART RATE: 78 BPM | TEMPERATURE: 98 F | RESPIRATION RATE: 16 BRPM

## 2019-06-25 DIAGNOSIS — Z79.899 OTHER LONG TERM (CURRENT) DRUG THERAPY: ICD-10-CM

## 2019-06-25 DIAGNOSIS — R21 RASH AND OTHER NONSPECIFIC SKIN ERUPTION: ICD-10-CM

## 2019-06-25 DIAGNOSIS — Z98.890 OTHER SPECIFIED POSTPROCEDURAL STATES: Chronic | ICD-10-CM

## 2019-06-25 DIAGNOSIS — Z87.59 PERSONAL HISTORY OF OTHER COMPLICATIONS OF PREGNANCY, CHILDBIRTH AND THE PUERPERIUM: Chronic | ICD-10-CM

## 2019-06-25 DIAGNOSIS — E03.9 HYPOTHYROIDISM, UNSPECIFIED: ICD-10-CM

## 2019-06-25 DIAGNOSIS — L30.9 DERMATITIS, UNSPECIFIED: ICD-10-CM

## 2019-06-25 PROCEDURE — 99283 EMERGENCY DEPT VISIT LOW MDM: CPT

## 2019-06-25 RX ORDER — LEVOTHYROXINE SODIUM 125 MCG
1 TABLET ORAL
Qty: 0 | Refills: 0 | DISCHARGE

## 2019-06-25 RX ORDER — HYDRALAZINE HCL 50 MG
1 TABLET ORAL
Qty: 0 | Refills: 0 | DISCHARGE

## 2019-06-25 RX ORDER — FAMOTIDINE 10 MG/ML
20 INJECTION INTRAVENOUS ONCE
Refills: 0 | Status: COMPLETED | OUTPATIENT
Start: 2019-06-25 | End: 2019-06-25

## 2019-06-25 RX ORDER — HYDROCORTISONE 1 %
1 OINTMENT (GRAM) TOPICAL ONCE
Refills: 0 | Status: COMPLETED | OUTPATIENT
Start: 2019-06-25 | End: 2019-06-25

## 2019-06-25 RX ORDER — LABETALOL HCL 100 MG
1 TABLET ORAL
Qty: 0 | Refills: 0 | DISCHARGE

## 2019-06-25 RX ORDER — GABAPENTIN 400 MG/1
1 CAPSULE ORAL
Qty: 0 | Refills: 0 | DISCHARGE

## 2019-06-25 RX ORDER — PREGABALIN 225 MG/1
1 CAPSULE ORAL
Qty: 0 | Refills: 0 | DISCHARGE

## 2019-06-25 RX ORDER — SIMVASTATIN 20 MG/1
1 TABLET, FILM COATED ORAL
Qty: 0 | Refills: 0 | DISCHARGE

## 2019-06-25 RX ORDER — ASPIRIN/CALCIUM CARB/MAGNESIUM 324 MG
1 TABLET ORAL
Qty: 0 | Refills: 0 | DISCHARGE

## 2019-06-25 RX ADMIN — Medication 1 APPLICATION(S): at 11:25

## 2019-06-25 RX ADMIN — FAMOTIDINE 20 MILLIGRAM(S): 10 INJECTION INTRAVENOUS at 11:22

## 2019-06-25 NOTE — ED PROVIDER NOTE - CLINICAL SUMMARY MEDICAL DECISION MAKING FREE TEXT BOX
Patient with pruritic rash nonspecific etiology. Recommend topical, benadryl and pepcid. Return to ED if condition worsen.

## 2019-06-25 NOTE — ED ADULT NURSE NOTE - OBJECTIVE STATEMENT
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

## 2019-06-25 NOTE — ED PROVIDER NOTE - OBJECTIVE STATEMENT
42 y/o f with no pmh present to ED c/o pruritic rash on left forearm , hand, and back. 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 Today is when her symptoms occurred. Admit of taking benadryl at 8am with mild relief. Denies changes of body lotions, creams, soaps, detergent, food, swelling to lips or tongue, dysphagia.

## 2019-06-25 NOTE — ED ADULT NURSE NOTE - CHPI ED NUR SYMPTOMS NEG
no difficulty swallowing/no wheezing/no vomiting/no nausea/no throat itching/no shortness of breath/no swelling of face, tongue/no congestion/no difficulty breathing

## 2019-06-25 NOTE — ED ADULT TRIAGE NOTE - OTHER COMPLAINTS
pt c.o hives with itchiness to L arm and back. 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

## 2019-09-16 ENCOUNTER — APPOINTMENT (OUTPATIENT)
Dept: HEART AND VASCULAR | Facility: CLINIC | Age: 44
End: 2019-09-16
Payer: MEDICAID

## 2019-09-16 VITALS
OXYGEN SATURATION: 100 % | HEART RATE: 82 BPM | HEIGHT: 61 IN | DIASTOLIC BLOOD PRESSURE: 100 MMHG | SYSTOLIC BLOOD PRESSURE: 174 MMHG | BODY MASS INDEX: 25.68 KG/M2 | WEIGHT: 136 LBS | TEMPERATURE: 99.1 F

## 2019-09-16 PROCEDURE — 93306 TTE W/DOPPLER COMPLETE: CPT

## 2019-09-16 PROCEDURE — 99215 OFFICE O/P EST HI 40 MIN: CPT

## 2019-09-16 PROCEDURE — 93000 ELECTROCARDIOGRAM COMPLETE: CPT

## 2019-09-16 RX ORDER — LEVOTHYROXINE SODIUM 0.17 MG/1
175 TABLET ORAL DAILY
Refills: 0 | Status: ACTIVE | COMMUNITY

## 2019-09-16 RX ORDER — IBUPROFEN 200 MG
600 CAPSULE ORAL DAILY
Refills: 0 | Status: ACTIVE | COMMUNITY

## 2019-09-16 RX ORDER — MULTIVIT-MIN/FOLIC/VIT K/LYCOP 400-300MCG
500 TABLET ORAL DAILY
Refills: 0 | Status: ACTIVE | COMMUNITY

## 2019-09-16 RX ORDER — LABETALOL HYDROCHLORIDE 200 MG/1
200 TABLET, FILM COATED ORAL 3 TIMES DAILY
Qty: 270 | Refills: 3 | Status: ACTIVE | COMMUNITY
Start: 1900-01-01 | End: 1900-01-01

## 2019-09-16 RX ORDER — FERROUS SULFATE 325(65) MG
325 TABLET ORAL TWICE DAILY
Refills: 0 | Status: ACTIVE | COMMUNITY

## 2019-09-16 RX ORDER — HYDROCHLOROTHIAZIDE 25 MG/1
25 TABLET ORAL DAILY
Qty: 31 | Refills: 5 | Status: ACTIVE | COMMUNITY

## 2019-09-16 NOTE — PHYSICAL EXAM
[Normal Appearance] : normal appearance [General Appearance - Well Developed] : well developed [Well Groomed] : well groomed [General Appearance - Well Nourished] : well nourished [No Deformities] : no deformities [General Appearance - In No Acute Distress] : no acute distress [Normal Conjunctiva] : the conjunctiva exhibited no abnormalities [Normal Oral Mucosa] : normal oral mucosa [Eyelids - No Xanthelasma] : the eyelids demonstrated no xanthelasmas [No Oral Cyanosis] : no oral cyanosis [Normal Jugular Venous A Waves Present] : normal jugular venous A waves present [No Oral Pallor] : no oral pallor [No Jugular Venous Shields A Waves] : no jugular venous shields A waves [Normal Jugular Venous V Waves Present] : normal jugular venous V waves present [Respiration, Rhythm And Depth] : normal respiratory rhythm and effort [Auscultation Breath Sounds / Voice Sounds] : lungs were clear to auscultation bilaterally [Exaggerated Use Of Accessory Muscles For Inspiration] : no accessory muscle use [FreeTextEntry1] : 2/6 semd to axilla [Heart Rate And Rhythm] : heart rate and rhythm were normal [Heart Sounds] : normal S1 and S2 [Nail Clubbing] : no clubbing of the fingernails [Petechial Hemorrhages (___cm)] : no petechial hemorrhages [Cyanosis, Localized] : no localized cyanosis [] : no ischemic changes

## 2019-09-16 NOTE — HISTORY OF PRESENT ILLNESS
[FreeTextEntry1] : last seen july 2018\par \par 44 F with uncontrolled HTN states after the birth of her child last april 2017 had an MI.  no intervention.  States that in [facility] she had an abnormal stress test for which she was given "blood thinners"." Is currently being seen by Dr Thompson for anemia 2/2 IUD use.  today she c/o sscp crescendo with associated palp and headache\par location: chest\par duration: intermittent\par  modifying factors: rest\par timing: secionds to minutes\par severity: 8/10 \par \par fhx _MI family\par \par Soc Hx non smoker\par \par EKG q waves anterior leads.\par \par 7/26/18\par  pt s/p cath on 7/23/18 LM patent dLAD 50% LCx patent RCA patent ef 55 edp 11\par cp midsternal +TTP\par \par 9/16/19 c/p sob cp and le edema, BP not controlled.  currently o n hydral 50 tid, lab 100 tid and hctz 25 qd.  ekg unchanged from prior.  known cad with distal lad disease.

## 2019-09-16 NOTE — DISCUSSION/SUMMARY
[FreeTextEntry1] : The number of diagnostic and/or management options \par CAD - continue current meds, asa, bb, statin\par \par HTN - not at goal, increaswe labatelol to 200 tid (HR 80s) and check DD on 2d echo.  will increase BB furhter and hydral as tolerated consider addin isordil for afterload reduction.  known cad  but uncahnged EKG will get CCTA cor to eval burden of disease.\par \par Anemia - 2/2 IUD  will d/w heme\par \par Labs, radiology: ekg\par \par Overall Risk: High Complexity\par

## 2019-09-17 ENCOUNTER — APPOINTMENT (OUTPATIENT)
Dept: HEMATOLOGY ONCOLOGY | Facility: CLINIC | Age: 44
End: 2019-09-17
Payer: MEDICAID

## 2019-09-17 VITALS
TEMPERATURE: 98.5 F | DIASTOLIC BLOOD PRESSURE: 100 MMHG | HEIGHT: 61 IN | HEART RATE: 73 BPM | SYSTOLIC BLOOD PRESSURE: 156 MMHG | BODY MASS INDEX: 25.49 KG/M2 | OXYGEN SATURATION: 100 % | RESPIRATION RATE: 14 BRPM | WEIGHT: 135 LBS

## 2019-09-17 PROCEDURE — 99214 OFFICE O/P EST MOD 30 MIN: CPT | Mod: 25

## 2019-09-17 PROCEDURE — 36415 COLL VENOUS BLD VENIPUNCTURE: CPT

## 2019-09-17 NOTE — CONSULT LETTER
[Dear  ___] : Dear  [unfilled], [Please see my note below.] : Please see my note below. [Consult Letter:] : I had the pleasure of evaluating your patient, [unfilled]. [Sincerely,] : Sincerely, [Consult Closing:] : Thank you very much for allowing me to participate in the care of this patient.  If you have any questions, please do not hesitate to contact me. [FreeTextEntry3] : DS

## 2019-09-17 NOTE — HISTORY OF PRESENT ILLNESS
[de-identified] : 42 years old female found to have anemia hemoglobin of 7.6 when she was seen in the  emergency room for right flank pain.\par \par  Patient is going to see GYN this Friday Dr. Suraj Haq.Patient admits to heavy menses because she has an IUD. pt was also found to have vitamin B12 and Vitamin D def.\par \par  No history of excess bleed with other procedures. [de-identified] : 9- pt was seen in [facility] , had again JAKE and low vitamin B12...was given IM b12, and oral Iron

## 2019-09-17 NOTE — ASSESSMENT
[FreeTextEntry1] :  repeat blood work from outside pt had JAKE and severe Vitamin B12 deficiency for which she got IM B12 with PCP...\par \par will arrange for IV Iron...\par \par She will see Dr. Jose in GI consult this Thursday\par \par f/u here in 1m...

## 2019-09-19 ENCOUNTER — APPOINTMENT (OUTPATIENT)
Dept: GASTROENTEROLOGY | Facility: CLINIC | Age: 44
End: 2019-09-19
Payer: MEDICAID

## 2019-09-19 VITALS
HEART RATE: 69 BPM | WEIGHT: 136 LBS | HEIGHT: 61 IN | BODY MASS INDEX: 25.68 KG/M2 | SYSTOLIC BLOOD PRESSURE: 110 MMHG | OXYGEN SATURATION: 98 % | DIASTOLIC BLOOD PRESSURE: 80 MMHG | RESPIRATION RATE: 14 BRPM

## 2019-09-19 DIAGNOSIS — K59.09 OTHER CONSTIPATION: ICD-10-CM

## 2019-09-19 PROCEDURE — 99204 OFFICE O/P NEW MOD 45 MIN: CPT

## 2019-09-19 RX ORDER — LINACLOTIDE 72 UG/1
72 CAPSULE, GELATIN COATED ORAL
Qty: 30 | Refills: 2 | Status: ACTIVE | COMMUNITY
Start: 2019-09-19 | End: 1900-01-01

## 2019-09-19 NOTE — ASSESSMENT
[FreeTextEntry1] : 44yr old F with PMHx significant for Iron deficiency anemia, HTN, non obstructive CAD (s/p cardiac cath on 7/23/18 LM patent, dLAD 50%, LCx patent, RCA patent, EF of 55%, EDP 11), RITESH on CPAP, Vitamin B12 def, Vitamin D def, referred by Dr Thompson for evaluation of anemia and abdominal pain.\par \par # Iron def anemia - \par - likely related to her heavy periods\par - but will need EGD/colonoscopy to r/o any GI etiology of her anemia\par - RBAI of scopes discussed with patient and she is willing to proceed with the scopes \par - prep instructions will be given to her\par - continue with a high fibre diet\par \par # H pylori -\par - likely contributing to her upper GI symptoms\par - continue with triple Rx and we will perform biopsies during EGD to confirm eradication\par \par # IBS-C - \par - patient with constipation, not responding to OTC aides - miralax, ex-lax, dulcolax, associated abdominal pain and relief of pain with defecation highly suggestive of IBS-C\par - will start her on Rx with linzess 75mcg daily\par - uptitrate as tolerated to help control her pain\par \par \par RTC in 3months

## 2019-09-19 NOTE — PHYSICAL EXAM
[General Appearance - Alert] : alert [General Appearance - In No Acute Distress] : in no acute distress [General Appearance - Well Nourished] : well nourished [General Appearance - Well-Appearing] : healthy appearing [Sclera] : the sclera and conjunctiva were normal [Outer Ear] : the ears and nose were normal in appearance [Neck Appearance] : the appearance of the neck was normal [Heart Sounds] : normal S1 and S2 [Bowel Sounds] : normal bowel sounds [Abdomen Soft] : soft [] : no hepato-splenomegaly [Abdomen Mass (___ Cm)] : no abdominal mass palpated [Abnormal Walk] : normal gait [Skin Color & Pigmentation] : normal skin color and pigmentation [No Focal Deficits] : no focal deficits [Oriented To Time, Place, And Person] : oriented to person, place, and time [FreeTextEntry1] : vague subprapubic tenderness on deep palpation, NR, NG

## 2019-09-19 NOTE — HISTORY OF PRESENT ILLNESS
[Heartburn] : stable heartburn [Nausea] : stable nausea [Vomiting] : stable vomiting [Diarrhea] : denies diarrhea [Constipation] : stable constipation [Yellow Skin Or Eyes (Jaundice)] : denies jaundice [Abdominal Pain] : stable abdominal pain [Abdominal Swelling] : abdominal swelling stable [Rectal Pain] : denies rectal pain [Wt Gain ___ Lbs] : no recent weight gain [Wt Loss ___ Lbs] : no recent weight loss [de-identified] : 44yr old F with PMHx significant for Iron deficiency anemia, HTN, non obstructive CAD (s/p cardiac cath on 7/23/18 LM patent, dLAD 50%, LCx patent, RCA patent, EF of 55%, EDP 11), RITESH on CPAP, Vitamin B12 def, Vitamin D def, referred by Dr Thompson for evaluation of anemia and abdominal pain.\par Patient reports that she has abdominal pain that has been present for a few months now. The pain is mostly associated with her constipation that she attributes to her medications, despite taking a lot of water throughout the day. \par She developed some acute exacerbation of abdominal pain 3 weeks ago described as crampy, intermittent and aggravated by po intake, nausea in the am, emesis (every morning, yellow fluid, some phlegm, has also seen streaks of blood with emesis), with associated bloating/distention of her abdomen. Reports that she has had to reduce her po intake due to the abdominal cramping when she eats. She had associated diarrhea, fever, chills 3weeks ago that took her to the hospital. She went to [facility] and was evaluated 3weeks ago with CT abd/pelvis which was negative per patient, IVF, and some maalox.\par \par Patient reports that she is mostly constipated (she has been on iron pills, and some antihypertensives), Lyman stool scale type 2, has a bowel movement every 2-3days,and sometimes can go for a week without a bowel movement. She reports straining with defecation, and she has also seen blood in her stool on occasion. Patient also states that she has also had some abdominal discomfort - crampy, located in suprapubic regions), with her constipation which has been going on for a few months and she gets relief after having a bowel movement. She has tried using Miralax, Ex-lax, Dulcolax, with no relief and ends up having to use a suppository before she can have a bowel movement.\par Patient denies dysphagia, odynophagia, jaundice, fever, chills, weight loss.\par She also has heavy periods which is felt to be due to her IUD.\par \par She went to see her PCP and had stool studies done on 9/10/19 which was positive for H pylori and she just picked up her triple Rx course today 9/19/19 - clarithromycin, ampicillin, and omeprazole - and will take this for 2 weeks\par \par FHx - Mother with hx of liver cirrhosis likely from some form of hepatitis - but she is not sure which one, Denies FHx of stomach/esophageal/pancreatic/colon cancer, IBD\par \par SHx - drinks alcohol socially, denies tobacco or illicit drug use\par \par EGD - never\par Colonoscopy - never

## 2019-09-23 ENCOUNTER — APPOINTMENT (OUTPATIENT)
Dept: CT IMAGING | Facility: HOSPITAL | Age: 44
End: 2019-09-23

## 2019-09-23 RX ORDER — FERUMOXYTOL 510 MG/17ML
510 INJECTION INTRAVENOUS
Qty: 0 | Refills: 0 | Status: COMPLETED | OUTPATIENT
Start: 2019-09-23 | End: 1900-01-01

## 2019-09-23 RX ADMIN — FERUMOXYTOL 0 MG/17ML: 510 INJECTION INTRAVENOUS at 00:00

## 2019-09-24 ENCOUNTER — MED ADMIN CHARGE (OUTPATIENT)
Age: 44
End: 2019-09-24

## 2019-09-24 ENCOUNTER — APPOINTMENT (OUTPATIENT)
Dept: RHEUMATOLOGY | Facility: CLINIC | Age: 44
End: 2019-09-24
Payer: MEDICAID

## 2019-09-24 VITALS
DIASTOLIC BLOOD PRESSURE: 94 MMHG | BODY MASS INDEX: 25.68 KG/M2 | SYSTOLIC BLOOD PRESSURE: 151 MMHG | WEIGHT: 136 LBS | TEMPERATURE: 97.8 F | OXYGEN SATURATION: 98 % | HEART RATE: 74 BPM | RESPIRATION RATE: 14 BRPM | HEIGHT: 61 IN

## 2019-09-24 VITALS
OXYGEN SATURATION: 95 % | HEIGHT: 61 IN | RESPIRATION RATE: 14 BRPM | BODY MASS INDEX: 25.68 KG/M2 | DIASTOLIC BLOOD PRESSURE: 92 MMHG | WEIGHT: 136 LBS | TEMPERATURE: 98.5 F | HEART RATE: 95 BPM | SYSTOLIC BLOOD PRESSURE: 136 MMHG

## 2019-09-24 VITALS
BODY MASS INDEX: 25.68 KG/M2 | OXYGEN SATURATION: 100 % | RESPIRATION RATE: 14 BRPM | WEIGHT: 136 LBS | HEART RATE: 75 BPM | HEIGHT: 61 IN | SYSTOLIC BLOOD PRESSURE: 149 MMHG | TEMPERATURE: 98.1 F | DIASTOLIC BLOOD PRESSURE: 99 MMHG

## 2019-09-24 VITALS
HEIGHT: 61 IN | WEIGHT: 136 LBS | HEART RATE: 70 BPM | BODY MASS INDEX: 25.68 KG/M2 | DIASTOLIC BLOOD PRESSURE: 91 MMHG | OXYGEN SATURATION: 100 % | RESPIRATION RATE: 14 BRPM | SYSTOLIC BLOOD PRESSURE: 157 MMHG

## 2019-09-24 PROCEDURE — 96365 THER/PROPH/DIAG IV INF INIT: CPT

## 2019-09-24 RX ORDER — FERUMOXYTOL 510 MG/17ML
510 INJECTION INTRAVENOUS
Qty: 0 | Refills: 0 | Status: COMPLETED | OUTPATIENT
Start: 2019-09-23

## 2019-09-25 ENCOUNTER — OTHER (OUTPATIENT)
Age: 44
End: 2019-09-25

## 2019-09-26 ENCOUNTER — FORM ENCOUNTER (OUTPATIENT)
Age: 44
End: 2019-09-26

## 2019-09-27 ENCOUNTER — APPOINTMENT (OUTPATIENT)
Dept: ORTHOPEDIC SURGERY | Facility: CLINIC | Age: 44
End: 2019-09-27
Payer: MEDICAID

## 2019-09-27 ENCOUNTER — OUTPATIENT (OUTPATIENT)
Dept: OUTPATIENT SERVICES | Facility: HOSPITAL | Age: 44
LOS: 1 days | End: 2019-09-27
Payer: MEDICAID

## 2019-09-27 DIAGNOSIS — Z98.890 OTHER SPECIFIED POSTPROCEDURAL STATES: Chronic | ICD-10-CM

## 2019-09-27 DIAGNOSIS — Z87.59 PERSONAL HISTORY OF OTHER COMPLICATIONS OF PREGNANCY, CHILDBIRTH AND THE PUERPERIUM: Chronic | ICD-10-CM

## 2019-09-27 DIAGNOSIS — M85.00 FIBROUS DYSPLASIA (MONOSTOTIC), UNSPECIFIED SITE: ICD-10-CM

## 2019-09-27 DIAGNOSIS — M25.552 PAIN IN LEFT HIP: ICD-10-CM

## 2019-09-27 PROCEDURE — 73502 X-RAY EXAM HIP UNI 2-3 VIEWS: CPT | Mod: 26,LT

## 2019-09-27 PROCEDURE — 73552 X-RAY EXAM OF FEMUR 2/>: CPT

## 2019-09-27 PROCEDURE — 73552 X-RAY EXAM OF FEMUR 2/>: CPT | Mod: 26,LT

## 2019-09-27 PROCEDURE — 99204 OFFICE O/P NEW MOD 45 MIN: CPT

## 2019-09-27 PROCEDURE — 73502 X-RAY EXAM HIP UNI 2-3 VIEWS: CPT

## 2019-09-27 NOTE — REASON FOR VISIT
[FreeTextEntry1] : 04/09/14 Followup of LEFT proximal femur curettage bone grafting and DHS for fibrous dysplasia more than 5 years ago

## 2019-09-27 NOTE — HISTORY OF PRESENT ILLNESS
[FreeTextEntry1] : The patient in almost 5 years. Overall she was doing okay however she started complaining of some pain in the past few months over the lateral side of the LEFT hip. Hurts with certain motions such external rotation as well as palpation. She occasionally feels some tingling down into her distal thigh as well. She has not had any new imaging recently and has never been worked up for this in foster saw her. [Worsening] : worsening [___ mths] : [unfilled] month(s) ago [2] : currently ~his/her~ pain is 2 out of 10 [Direct Pressure] : worsened by direct pressure [Walking] : worsened by walking

## 2019-09-27 NOTE — REVIEW OF SYSTEMS
[Chills] : chills [Feeling Tired] : feeling tired [Sight Problems] : sight problems [Abdominal Pain] : abdominal pain [Constipation] : constipation [Heartburn] : heartburn [Joint Pain] : joint pain [Headache] : headache [Feeling Weak] : feeling week [FreeTextEntry1] : With the many findings in review of systems I asked her to review this with her medical doctor.

## 2019-09-27 NOTE — PHYSICAL EXAM
[General Appearance - Well-Appearing] : Well appearing [FreeTextEntry1] : On exam the patient stands and good balance. She does not have a limp. She does have tenderness to palpation over the greater trochanter at the proximal portion of the wound and slightly posterior to this in the area of the most lateral portion of the hip screw. She does have a positive Angelina test. She has mild tenderness distally but no palpable masses. She has full range of motion of her hip and knee and is neurovascularly intact. She has no pain with internal rotation or flexion or anything to suggest problems intra-articular. [General Appearance - Well Nourished] : well nourished [Oriented To Time, Place, And Person] : Oriented to person, place, and time [Affect] : The affect was normal. [Impaired Insight] : Insight and judgment were intact [Mood] : the mood was normal [Neck Cervical Mass (___cm)] : no neck mass was observed [Sclera] : the sclera and conjunctiva were normal [] : No respiratory distress [Heart Rate And Rhythm] : heart rate was normal and rhythm regular [Abdomen Soft] : Soft [Normal Station and Gait] : gait and station were normal [Tenderness] : tenderness [Swelling] : no swelling [Masses] : no masses [Incision Healed - Describe:] : Incision is healed ~M [Full ROM Unless otherwise noted:] : Full range of motion unless otherwise noted: [LE  Motor Strength Normal unless otherwise noted:] : 5/5 strength in bilateral lower extemities unless otherwise noted. [Normal] : Sensation intact to light touch.

## 2019-09-27 NOTE — DISCUSSION/SUMMARY
[All Questions Answered] : Patient (and family) had all questions answered to an agreeable level of satisfaction [Interested in Proceeding] : Patient (and family) expressed understanding and interest in proceeding with the plan as outlined [de-identified] : At this point the patient seems to have findings of postsurgical trochanteric bursitis from her dynamic hip screw. The recommendations included over-the-counter anti-inflammatories vs. physical therapy as well as injection. I told her that I would not take out the hardware anytime soon. She is not emergent surgery or an injection. She will start anti-inflammatory medications as well as physical therapy. I will see her back in a year for surveillance or on p.r.n. basis for pain.\par \par If imaging was ordered, the patient was told to make an appointment to review findings right after all imaging is completed.\par \par We discussed risks, benefits and alternatives. Rationale of care was reviewed and all questions were answered. Patient (and family) had all questions answered to her degree of the level of satisfaction. Patient (and family) expressed understanding and interest in proceeding with the plan as outlined.\par \par \par \par \par This note was done with a voice recognition transcription software and any typos are related to this rather than medical error.

## 2019-09-30 ENCOUNTER — MED ADMIN CHARGE (OUTPATIENT)
Age: 44
End: 2019-09-30

## 2019-09-30 ENCOUNTER — APPOINTMENT (OUTPATIENT)
Dept: RHEUMATOLOGY | Facility: CLINIC | Age: 44
End: 2019-09-30
Payer: MEDICAID

## 2019-09-30 VITALS
DIASTOLIC BLOOD PRESSURE: 85 MMHG | HEIGHT: 61 IN | SYSTOLIC BLOOD PRESSURE: 143 MMHG | RESPIRATION RATE: 14 BRPM | OXYGEN SATURATION: 100 % | WEIGHT: 136 LBS | HEART RATE: 61 BPM | TEMPERATURE: 98.6 F | BODY MASS INDEX: 25.68 KG/M2

## 2019-09-30 VITALS
TEMPERATURE: 98 F | BODY MASS INDEX: 25.68 KG/M2 | RESPIRATION RATE: 14 BRPM | HEART RATE: 60 BPM | WEIGHT: 136 LBS | HEIGHT: 61 IN | OXYGEN SATURATION: 100 % | DIASTOLIC BLOOD PRESSURE: 82 MMHG | SYSTOLIC BLOOD PRESSURE: 133 MMHG

## 2019-09-30 VITALS
HEART RATE: 70 BPM | TEMPERATURE: 98.7 F | WEIGHT: 136 LBS | RESPIRATION RATE: 16 BRPM | HEIGHT: 61 IN | DIASTOLIC BLOOD PRESSURE: 90 MMHG | OXYGEN SATURATION: 97 % | BODY MASS INDEX: 25.68 KG/M2 | SYSTOLIC BLOOD PRESSURE: 147 MMHG

## 2019-09-30 PROCEDURE — 96365 THER/PROPH/DIAG IV INF INIT: CPT

## 2019-09-30 RX ORDER — FERUMOXYTOL 510 MG/17ML
510 INJECTION INTRAVENOUS
Qty: 0 | Refills: 0 | Status: COMPLETED | OUTPATIENT
Start: 2019-09-23

## 2019-10-08 ENCOUNTER — APPOINTMENT (OUTPATIENT)
Dept: GASTROENTEROLOGY | Facility: CLINIC | Age: 44
End: 2019-10-08

## 2019-10-08 ENCOUNTER — RESULT REVIEW (OUTPATIENT)
Age: 44
End: 2019-10-08

## 2019-10-08 ENCOUNTER — OUTPATIENT (OUTPATIENT)
Dept: OUTPATIENT SERVICES | Facility: HOSPITAL | Age: 44
LOS: 1 days | Discharge: HOME | End: 2019-10-08
Payer: MEDICAID

## 2019-10-08 ENCOUNTER — APPOINTMENT (OUTPATIENT)
Dept: GASTROENTEROLOGY | Facility: HOSPITAL | Age: 44
End: 2019-10-08

## 2019-10-08 DIAGNOSIS — Z98.890 OTHER SPECIFIED POSTPROCEDURAL STATES: Chronic | ICD-10-CM

## 2019-10-08 DIAGNOSIS — Z87.59 PERSONAL HISTORY OF OTHER COMPLICATIONS OF PREGNANCY, CHILDBIRTH AND THE PUERPERIUM: Chronic | ICD-10-CM

## 2019-10-08 PROCEDURE — 43239 EGD BIOPSY SINGLE/MULTIPLE: CPT

## 2019-10-08 PROCEDURE — 45385 COLONOSCOPY W/LESION REMOVAL: CPT

## 2019-10-08 PROCEDURE — 88305 TISSUE EXAM BY PATHOLOGIST: CPT

## 2019-10-08 PROCEDURE — 45378 DIAGNOSTIC COLONOSCOPY: CPT

## 2019-10-09 LAB — SURGICAL PATHOLOGY STUDY: SIGNIFICANT CHANGE UP

## 2019-10-11 ENCOUNTER — APPOINTMENT (OUTPATIENT)
Dept: NEPHROLOGY | Facility: CLINIC | Age: 44
End: 2019-10-11
Payer: MEDICAID

## 2019-10-11 VITALS — DIASTOLIC BLOOD PRESSURE: 92 MMHG | HEART RATE: 75 BPM | RESPIRATION RATE: 16 BRPM | SYSTOLIC BLOOD PRESSURE: 162 MMHG

## 2019-10-11 DIAGNOSIS — I10 ESSENTIAL (PRIMARY) HYPERTENSION: ICD-10-CM

## 2019-10-11 DIAGNOSIS — N28.1 CYST OF KIDNEY, ACQUIRED: ICD-10-CM

## 2019-10-11 DIAGNOSIS — R31.29 OTHER MICROSCOPIC HEMATURIA: ICD-10-CM

## 2019-10-11 DIAGNOSIS — E83.51 HYPOCALCEMIA: ICD-10-CM

## 2019-10-11 PROCEDURE — 99204 OFFICE O/P NEW MOD 45 MIN: CPT

## 2019-10-11 RX ORDER — HYDRALAZINE HYDROCHLORIDE 50 MG/1
50 TABLET ORAL
Refills: 0 | Status: ACTIVE | COMMUNITY

## 2019-10-11 NOTE — REVIEW OF SYSTEMS
[Chest Pain] : chest pain [As Noted in HPI] : as noted in HPI [Constipation] : constipation [Arthralgias] : arthralgias [Fainting] : fainting [Negative] : Heme/Lymph

## 2019-10-14 ENCOUNTER — APPOINTMENT (OUTPATIENT)
Dept: HEART AND VASCULAR | Facility: CLINIC | Age: 44
End: 2019-10-14

## 2019-10-14 LAB
24R-OH-CALCIDIOL SERPL-MCNC: 43.1 PG/ML
25(OH)D3 SERPL-MCNC: 8.3 NG/ML
ALBUMIN SERPL ELPH-MCNC: 4.4 G/DL
ALDOSTERONE SERUM: <3 NG/DL
ANION GAP SERPL CALC-SCNC: 13 MMOL/L
APPEARANCE: CLEAR
BACTERIA: NEGATIVE
BILIRUBIN URINE: NEGATIVE
BLOOD URINE: NORMAL
BUN SERPL-MCNC: 12 MG/DL
C3 SERPL-MCNC: 126 MG/DL
C4 SERPL-MCNC: 24 MG/DL
CA-I SERPL-SCNC: 1.2 MMOL/L
CALCIUM SERPL-MCNC: 9.1 MG/DL
CALCIUM SERPL-MCNC: 9.1 MG/DL
CHLORIDE SERPL-SCNC: 105 MMOL/L
CO2 SERPL-SCNC: 24 MMOL/L
COLOR: NORMAL
CORTIS SERPL-MCNC: 6.4 UG/DL
CREAT SERPL-MCNC: 0.59 MG/DL
CREAT SPEC-SCNC: 52 MG/DL
CREAT SPEC-SCNC: 52 MG/DL
CREAT/PROT UR: 0.2 RATIO
DEPRECATED KAPPA LC FREE/LAMBDA SER: 1.11 RATIO
DSDNA AB SER-ACNC: <12 IU/ML
GLUCOSE QUALITATIVE U: NEGATIVE
GLUCOSE SERPL-MCNC: 75 MG/DL
HYALINE CASTS: 0 /LPF
KAPPA LC CSF-MCNC: 1.4 MG/DL
KAPPA LC SERPL-MCNC: 1.55 MG/DL
KETONES URINE: NEGATIVE
LEUKOCYTE ESTERASE URINE: ABNORMAL
MAGNESIUM SERPL-MCNC: 2.1 MG/DL
MICROALBUMIN 24H UR DL<=1MG/L-MCNC: 1.2 MG/DL
MICROALBUMIN/CREAT 24H UR-RTO: 23 MG/G
MICROSCOPIC-UA: NORMAL
NITRITE URINE: NEGATIVE
PARATHYROID HORMONE INTACT: 77 PG/ML
PH URINE: 6.5
PHOSPHATE SERPL-MCNC: 2.8 MG/DL
POTASSIUM SERPL-SCNC: 3.9 MMOL/L
PROT UR-MCNC: 8 MG/DL
PROTEIN URINE: NEGATIVE
RED BLOOD CELLS URINE: 2 /HPF
SODIUM SERPL-SCNC: 142 MMOL/L
SPECIFIC GRAVITY URINE: 1.01
SQUAMOUS EPITHELIAL CELLS: 4 /HPF
URATE SERPL-MCNC: 3.4 MG/DL
URINE CYTOLOGY: NORMAL
UROBILINOGEN URINE: NORMAL
WHITE BLOOD CELLS URINE: 7 /HPF

## 2019-10-14 NOTE — ASSESSMENT
[FreeTextEntry1] : 43yo F with migraines, IBS-C, non obstructive CAD (s/p cardiac cath on 7/23/18 LM patent, dLAD 50%, LCx patent, RCA patent, EF of 55%, EDP 11) fibrous dysplasia of femur, B12/Fe deficiency anemia thought to be 2/2 chronic blood loss (eats red meat) and uncontrolled HTN referred for microscopic hematuria and renal cyst:\par \par # HTN uncontrolled/labile\par normal renal fx/lytes/k/bicarb. + family hx\par Hyperthyroid s/p iodine tx 2-3x now hypothyroid. \par - lately BP at home 140s/90s since started HCTZ, prior to that was 170s. Compliant with meds, takes hydralazine 4x/day which is burdensome. Will try to simplify regimen and r/o secondary causes. Echo and renal sono with duplex both unremarkable. \par - not on CCB or ROSALINDA blockade\par Discussed with patient the importance of bp control on delaying progression of renal disease. Advised low Na diet, exercise, weight loss. Checking blood pressure at home and recording. Pt to call if bp >130/80s so we can add Losartan. \par - needs sleep study as well, per pt has never had one and denies RITESH dx despite being listed in her problem list.\par \par # Microscopic and gross hematuria\par - 9/7/19 u/a 10 rbc, large "Hb" not on menses and doesn’t spot intermittently\par - 1 episode earlier this year of gross hematuria associated with a UTI she thinks.\par - 2 other U/A's from 2018 reviewed with no hematuria. Recheck today along w cell cytology, never a smoker. May be from intermittent uterine bleeding given her UTI however pt denies. If repeat u/a + suggest cystoscopy, if this is negative and hematuria persists, may be related to glomerulonephritis such as IgA nephropathy or underlying anatomical issue such as TBM. r/o SLE, paraproteinemia today. Reviewed all prior documents with patient from [id number], HepB+C/HIV negative\par -CT 2018 and renal sono 9/19 reviewed - no ROSALINDA and stable 2cm R simple renal cyst doesn't need monitoring. \par u/a 2018 bland, Cr 0.6, K/bicarb wnl. \par \par # Hypocalcemia - Ca 7.9 while on calcium supplements, Alb 3.8, alk ph 69. Unclear etiology. Recheck along with vit D, Ph, ipth, iCal and Mg\par \par RTC in 3 months\par \par Addendum: reviewed labs, Ca normalized, normal renal fx and electrolytes, vit D deficiency on vit d supplements. Diaz <3, PRA pending.u/a with a few wbcs, no proteinuria.

## 2019-10-14 NOTE — CONSULT LETTER
[Dear  ___] : Dear  [unfilled], [Please see my note below.] : Please see my note below. [Consult Closing:] : Thank you very much for allowing me to participate in the care of this patient.  If you have any questions, please do not hesitate to contact me. [Sincerely,] : Sincerely, [DrDo  ___] : Dr. ALVAREZ [___] : [unfilled] [FreeTextEntry1] : PCP Dr Segun Esposito NP Alfonso Carter\par Cardiologist: Dr. Bourgeois - manages BP \par OB Dr. Suraj Haq [FreeTextEntry3] : Rocio Horton MD\par  of Medicine\par Division of Kidney Diseases and Hypertension\par [facility] \par [employer] [facility] Tel: [phone]\par Email: [email or web address]\par \par

## 2019-10-14 NOTE — HISTORY OF PRESENT ILLNESS
[FreeTextEntry1] : 43yo F with migraines, IBS-C, non obstructive CAD (s/p cardiac cath on 7/23/18 LM patent, dLAD 50%, LCx patent, RCA patent, EF of 55%, EDP 11) fibrous dysplasia of femur, B12/Fe deficiency anemia thought to be 2/2 chronic blood loss (eats red meat) and uncontrolled HTN referred for microscopic hematuria and renal cyst:\par \par PCP Dr Segun Esposito NP Alfonso Carter\par Cardiologist: Dr. Bourgeois - manages BP \par OB Dr. Suraj Haq\par \par R flank pain radiating to RUQ started a few months ago, cramping/stabbing in nature, went to ER and told there was no stones. Pain is intermittent, comes at least daily, so severe that she has to stop what shes doing. Takes tylenol or advil which helps a bit. Associated with nausea, vomiting and diarrhea. Went to ER CT "negative" given Maalox. Went to see GI who did an investigative colonoscopy and was told looks wnl. Also notes she's seen blood in her stool as well. Vomiting and diarrhea has since resolved but she's still having the pain and nausea. H Pylori + s/p course of abx.\par Sees Dr. Thompson for anemia, on Fe infusions. Per pt no etiology found, anemic for last 2 years, doesn't remember a childhood history of it. Heavy menses. Getting her IUD removed next week in case it's related. \par \par HTN diagnosed at 31yo. Was on medication for about 1 year then taken off because her BP normalized (she denies losing weight or any other reason for drop in BP). Required reinitiation of antihypertensives around 36yo with propranolol. \par \par [id number] pregnant with son c/b hypertension, full term at 37.5 by planned Csection then post partum MI per pt, started on ASA and Labetalol. \par 2001 had severe PEC requiring emergency Csection at 33 weeks gestation, was on antihypertensives and off post partum.\par 2010 PEC requiring emergency Csection at 36 weeks\par \par MI again 2018. Says she has several valves or arteries that need "stenting". \par Complains of "constant" chest pain. 1 episode was a few weeks ago, in the shower she had chest pain, SOB and fainted, EMS found her told bp was 200s, given nitro, chest pain did not improve w nitro. \par \par Fam: DM/HTN, mother passed from viral hepatitis/cirrhosis and renal failure.

## 2019-10-15 LAB
ALBUMIN MFR SERPL ELPH: 60.1 %
ALBUMIN SERPL-MCNC: 4.3 G/DL
ALBUMIN/GLOB SERPL: 1.5 RATIO
ALPHA1 GLOB MFR SERPL ELPH: 3.8 %
ALPHA1 GLOB SERPL ELPH-MCNC: 0.3 G/DL
ALPHA2 GLOB MFR SERPL ELPH: 10 %
ALPHA2 GLOB SERPL ELPH-MCNC: 0.7 G/DL
ANA SER IF-ACNC: NEGATIVE
B-GLOBULIN MFR SERPL ELPH: 12.2 %
B-GLOBULIN SERPL ELPH-MCNC: 0.9 G/DL
GAMMA GLOB FLD ELPH-MCNC: 1 G/DL
GAMMA GLOB MFR SERPL ELPH: 13.9 %
INTERPRETATION SERPL IEP-IMP: NORMAL
M PROTEIN SPEC IFE-MCNC: NORMAL
PROT SERPL-MCNC: 7.1 G/DL
PROT SERPL-MCNC: 7.1 G/DL

## 2019-10-17 ENCOUNTER — APPOINTMENT (OUTPATIENT)
Dept: HEMATOLOGY ONCOLOGY | Facility: CLINIC | Age: 44
End: 2019-10-17

## 2019-10-28 LAB
METANEPHRINE, PL: <10 PG/ML
NORMETANEPHRINE, PL: 36 PG/ML
RENIN ACTIVITY, PLASMA: 0.22 NG/ML/HR

## 2019-12-11 ENCOUNTER — APPOINTMENT (OUTPATIENT)
Dept: OBGYN | Facility: CLINIC | Age: 44
End: 2019-12-11

## 2020-02-20 ENCOUNTER — APPOINTMENT (OUTPATIENT)
Dept: HEMATOLOGY ONCOLOGY | Facility: CLINIC | Age: 45
End: 2020-02-20
Payer: MEDICAID

## 2020-02-20 ENCOUNTER — LABORATORY RESULT (OUTPATIENT)
Age: 45
End: 2020-02-20

## 2020-02-20 VITALS
WEIGHT: 139 LBS | TEMPERATURE: 98 F | OXYGEN SATURATION: 100 % | HEIGHT: 61 IN | SYSTOLIC BLOOD PRESSURE: 165 MMHG | HEART RATE: 70 BPM | DIASTOLIC BLOOD PRESSURE: 92 MMHG | BODY MASS INDEX: 26.24 KG/M2

## 2020-02-20 DIAGNOSIS — D50.0 IRON DEFICIENCY ANEMIA SECONDARY TO BLOOD LOSS (CHRONIC): ICD-10-CM

## 2020-02-20 DIAGNOSIS — E53.8 DEFICIENCY OF OTHER SPECIFIED B GROUP VITAMINS: ICD-10-CM

## 2020-02-20 DIAGNOSIS — E55.9 VITAMIN D DEFICIENCY, UNSPECIFIED: ICD-10-CM

## 2020-02-20 PROCEDURE — 36415 COLL VENOUS BLD VENIPUNCTURE: CPT

## 2020-02-20 PROCEDURE — 99214 OFFICE O/P EST MOD 30 MIN: CPT | Mod: 25

## 2020-02-24 LAB
25(OH)D3 SERPL-MCNC: 8 NG/ML
ALBUMIN SERPL ELPH-MCNC: 4.6 G/DL
ALP BLD-CCNC: 78 U/L
ALT SERPL-CCNC: 11 U/L
ANION GAP SERPL CALC-SCNC: 17 MMOL/L
AST SERPL-CCNC: 15 U/L
BASOPHILS # BLD AUTO: 0.04 K/UL
BASOPHILS NFR BLD AUTO: 0.7 %
BILIRUB SERPL-MCNC: 0.3 MG/DL
BUN SERPL-MCNC: 11 MG/DL
CALCIUM SERPL-MCNC: 9.2 MG/DL
CHLORIDE SERPL-SCNC: 106 MMOL/L
CO2 SERPL-SCNC: 20 MMOL/L
CREAT SERPL-MCNC: 0.72 MG/DL
EOSINOPHIL # BLD AUTO: 0.16 K/UL
EOSINOPHIL NFR BLD AUTO: 2.6 %
ERYTHROCYTE [SEDIMENTATION RATE] IN BLOOD BY WESTERGREN METHOD: 24 MM/HR
FERRITIN SERPL-MCNC: 34 NG/ML
GLUCOSE SERPL-MCNC: 103 MG/DL
HAPTOGLOB SERPL-MCNC: 168 MG/DL
HCT VFR BLD CALC: 40 %
HGB BLD-MCNC: 12.9 G/DL
IMM GRANULOCYTES NFR BLD AUTO: 0.3 %
IRON SATN MFR SERPL: 14 %
IRON SERPL-MCNC: 44 UG/DL
LDH SERPL-CCNC: 167 U/L
LYMPHOCYTES # BLD AUTO: 1.96 K/UL
LYMPHOCYTES NFR BLD AUTO: 31.9 %
MAN DIFF?: NORMAL
MCHC RBC-ENTMCNC: 28.9 PG
MCHC RBC-ENTMCNC: 32.3 GM/DL
MCV RBC AUTO: 89.5 FL
MONOCYTES # BLD AUTO: 0.36 K/UL
MONOCYTES NFR BLD AUTO: 5.9 %
NEUTROPHILS # BLD AUTO: 3.61 K/UL
NEUTROPHILS NFR BLD AUTO: 58.6 %
PLATELET # BLD AUTO: 287 K/UL
POTASSIUM SERPL-SCNC: 3.9 MMOL/L
PROT SERPL-MCNC: 7 G/DL
RBC # BLD: 4.47 M/UL
RBC # FLD: 12.8 %
SODIUM SERPL-SCNC: 143 MMOL/L
TIBC SERPL-MCNC: 309 UG/DL
TSH SERPL-ACNC: 4.71 UIU/ML
UIBC SERPL-MCNC: 265 UG/DL
VIT B12 SERPL-MCNC: 360 PG/ML
WBC # FLD AUTO: 6.15 K/UL

## 2020-02-24 RX ORDER — ERGOCALCIFEROL 1.25 MG/1
1.25 MG CAPSULE, LIQUID FILLED ORAL
Qty: 4 | Refills: 5 | Status: ACTIVE | COMMUNITY
Start: 2018-07-05 | End: 1900-01-01

## 2020-02-24 NOTE — HISTORY OF PRESENT ILLNESS
[de-identified] : 42 years old female found to have anemia hemoglobin of 7.6 when she was seen in the  emergency room for right flank pain.\par \par  Patient is going to see GYN this Friday Dr. Suraj Haq.Patient admits to heavy menses because she has an IUD. pt was also found to have vitamin B12 and Vitamin D def.\par \par  No history of excess bleed with other procedures. [de-identified] : 9- pt was seen in [facility] , had again JAKE and low vitamin B12...was given IM b12, and oral Iron\par \par February 20 years 2020 returns for follow-up... States she has been bleeding heavily... Has been seeing a GYN at [facility]... Has been taking oral vitamin D vitamin B12 and iron...

## 2020-02-24 NOTE — ASSESSMENT
[FreeTextEntry1] :  repeat blood work as above.\par \par Patient's hemoglobin is normal at 12 g/dL, her vitamin B12 remains low, and her vitamin D is 8 unchanged from the previous 2 times.. And I wonder if patient indeed takes her vitamin D replacement.\par \par \par \par f/u 3 month or sooner if needed...

## 2020-03-09 ENCOUNTER — APPOINTMENT (OUTPATIENT)
Dept: NEPHROLOGY | Facility: CLINIC | Age: 45
End: 2020-03-09

## 2020-03-31 ENCOUNTER — APPOINTMENT (OUTPATIENT)
Dept: OBGYN | Facility: CLINIC | Age: 45
End: 2020-03-31

## 2020-04-26 ENCOUNTER — MESSAGE (OUTPATIENT)
Age: 45
End: 2020-04-26

## 2020-09-04 ENCOUNTER — APPOINTMENT (OUTPATIENT)
Dept: NEPHROLOGY | Facility: CLINIC | Age: 45
End: 2020-09-04

## 2023-08-10 NOTE — ED ADULT NURSE NOTE - NS PRO PASSIVE SMOKE EXP
[Dear  ___] : Dear  [unfilled], [Consult Letter:] : I had the pleasure of evaluating your patient, [unfilled]. [Please see my note below.] : Please see my note below. [Consult Closing:] : Thank you very much for allowing me to participate in the care of this patient.  If you have any questions, please do not hesitate to contact me. [Sincerely,] : Sincerely, [FreeTextEntry3] : Talia Bazan NYessicaP.\par   No

## 2024-06-04 NOTE — DATA REVIEWED
Addended by: ANTHONY BRANDON on: 6/4/2024 01:40 PM     Modules accepted: Orders     [Imaging Present] : Present [de-identified] : X-rays today AP pelvis views of the LEFT hip and views of the entire femur showed a dynamic hip screw in good position. The area with the allograft is still present however we can certainly see where the fibrous dysplasia was. It has not progressed. There is no new expansion. There is nothing else in the other bones. This does not go below the intertrochanteric line. There is nothing found in the bone distally.Hardware is in good position.The lag screw has not cut out or compressed laterally.